# Patient Record
Sex: FEMALE | Race: WHITE | NOT HISPANIC OR LATINO | Employment: OTHER | URBAN - METROPOLITAN AREA
[De-identification: names, ages, dates, MRNs, and addresses within clinical notes are randomized per-mention and may not be internally consistent; named-entity substitution may affect disease eponyms.]

---

## 2020-11-20 ENCOUNTER — APPOINTMENT (OUTPATIENT)
Dept: NUCLEAR MEDICINE | Facility: CLINIC | Age: 75
End: 2020-11-20
Attending: EMERGENCY MEDICINE
Payer: COMMERCIAL

## 2020-11-20 ENCOUNTER — APPOINTMENT (OUTPATIENT)
Dept: GENERAL RADIOLOGY | Facility: CLINIC | Age: 75
End: 2020-11-20
Attending: EMERGENCY MEDICINE
Payer: COMMERCIAL

## 2020-11-20 ENCOUNTER — HOSPITAL ENCOUNTER (EMERGENCY)
Facility: CLINIC | Age: 75
Discharge: HOME | End: 2020-11-20
Attending: EMERGENCY MEDICINE | Admitting: EMERGENCY MEDICINE
Payer: COMMERCIAL

## 2020-11-20 VITALS
DIASTOLIC BLOOD PRESSURE: 63 MMHG | TEMPERATURE: 97.2 F | SYSTOLIC BLOOD PRESSURE: 142 MMHG | RESPIRATION RATE: 20 BRPM | OXYGEN SATURATION: 99 % | HEART RATE: 67 BPM

## 2020-11-20 DIAGNOSIS — R07.9 CHEST PAIN, UNSPECIFIED TYPE: ICD-10-CM

## 2020-11-20 DIAGNOSIS — R06.02 SOB (SHORTNESS OF BREATH): ICD-10-CM

## 2020-11-20 LAB
ANION GAP SERPL CALCULATED.3IONS-SCNC: 5 MMOL/L (ref 3–14)
BASOPHILS # BLD AUTO: 0.1 10E9/L (ref 0–0.2)
BASOPHILS NFR BLD AUTO: 0.6 %
BUN SERPL-MCNC: 48 MG/DL (ref 7–30)
CALCIUM SERPL-MCNC: 9.7 MG/DL (ref 8.5–10.1)
CHLORIDE SERPL-SCNC: 108 MMOL/L (ref 94–109)
CO2 SERPL-SCNC: 25 MMOL/L (ref 20–32)
CREAT SERPL-MCNC: 1.42 MG/DL (ref 0.52–1.04)
D DIMER PPP FEU-MCNC: 0.7 UG/ML FEU (ref 0–0.5)
DIFFERENTIAL METHOD BLD: NORMAL
EOSINOPHIL # BLD AUTO: 0.1 10E9/L (ref 0–0.7)
EOSINOPHIL NFR BLD AUTO: 0.7 %
ERYTHROCYTE [DISTWIDTH] IN BLOOD BY AUTOMATED COUNT: 14.1 % (ref 10–15)
GFR SERPL CREATININE-BSD FRML MDRD: 36 ML/MIN/{1.73_M2}
GLUCOSE SERPL-MCNC: 90 MG/DL (ref 70–99)
HCT VFR BLD AUTO: 38.1 % (ref 35–47)
HGB BLD-MCNC: 12.3 G/DL (ref 11.7–15.7)
IMM GRANULOCYTES # BLD: 0 10E9/L (ref 0–0.4)
IMM GRANULOCYTES NFR BLD: 0.4 %
LYMPHOCYTES # BLD AUTO: 1.8 10E9/L (ref 0.8–5.3)
LYMPHOCYTES NFR BLD AUTO: 17.1 %
MCH RBC QN AUTO: 30.3 PG (ref 26.5–33)
MCHC RBC AUTO-ENTMCNC: 32.3 G/DL (ref 31.5–36.5)
MCV RBC AUTO: 94 FL (ref 78–100)
MONOCYTES # BLD AUTO: 1 10E9/L (ref 0–1.3)
MONOCYTES NFR BLD AUTO: 9.2 %
NEUTROPHILS # BLD AUTO: 7.8 10E9/L (ref 1.6–8.3)
NEUTROPHILS NFR BLD AUTO: 72 %
NRBC # BLD AUTO: 0 10*3/UL
NRBC BLD AUTO-RTO: 0 /100
NT-PROBNP SERPL-MCNC: 191 PG/ML (ref 0–1800)
PLATELET # BLD AUTO: 196 10E9/L (ref 150–450)
POTASSIUM SERPL-SCNC: 4 MMOL/L (ref 3.4–5.3)
RBC # BLD AUTO: 4.06 10E12/L (ref 3.8–5.2)
SODIUM SERPL-SCNC: 138 MMOL/L (ref 133–144)
TROPONIN I SERPL-MCNC: <0.015 UG/L (ref 0–0.04)
TROPONIN I SERPL-MCNC: <0.015 UG/L (ref 0–0.04)
WBC # BLD AUTO: 10.8 10E9/L (ref 4–11)

## 2020-11-20 PROCEDURE — 343N000001 HC RX 343: Performed by: EMERGENCY MEDICINE

## 2020-11-20 PROCEDURE — 99285 EMERGENCY DEPT VISIT HI MDM: CPT | Mod: 25

## 2020-11-20 PROCEDURE — 85025 COMPLETE CBC W/AUTO DIFF WBC: CPT | Performed by: EMERGENCY MEDICINE

## 2020-11-20 PROCEDURE — 93005 ELECTROCARDIOGRAM TRACING: CPT

## 2020-11-20 PROCEDURE — 83880 ASSAY OF NATRIURETIC PEPTIDE: CPT | Performed by: EMERGENCY MEDICINE

## 2020-11-20 PROCEDURE — 80048 BASIC METABOLIC PNL TOTAL CA: CPT | Performed by: EMERGENCY MEDICINE

## 2020-11-20 PROCEDURE — 96374 THER/PROPH/DIAG INJ IV PUSH: CPT

## 2020-11-20 PROCEDURE — 85379 FIBRIN DEGRADATION QUANT: CPT | Performed by: EMERGENCY MEDICINE

## 2020-11-20 PROCEDURE — 78580 LUNG PERFUSION IMAGING: CPT

## 2020-11-20 PROCEDURE — 71045 X-RAY EXAM CHEST 1 VIEW: CPT

## 2020-11-20 PROCEDURE — 84484 ASSAY OF TROPONIN QUANT: CPT | Mod: 91 | Performed by: EMERGENCY MEDICINE

## 2020-11-20 PROCEDURE — A9540 TC99M MAA: HCPCS | Performed by: EMERGENCY MEDICINE

## 2020-11-20 RX ADMIN — KIT FOR THE PREPARATION OF TECHNETIUM TC 99M ALBUMIN AGGREGATED 3.3 MCI.: 2.5 INJECTION, POWDER, FOR SOLUTION INTRAVENOUS at 22:21

## 2020-11-20 ASSESSMENT — ENCOUNTER SYMPTOMS
FEVER: 0
COUGH: 0
SHORTNESS OF BREATH: 1

## 2020-11-20 NOTE — ED AVS SNAPSHOT
[facility] Emergency Dept  [address]  [facility] [address]  Phone: [phone]  Fax: [phone]                                    Brandy Muñoz   MRN: [id number]    Department: [facility] Emergency Dept   Date of Visit: 11/20/2020           After Visit Summary Signature Page    I have received my discharge instructions, and my questions have been answered. I have discussed any challenges I see with this plan with the nurse or doctor.    ..........................................................................................................................................  Patient/Patient Representative Signature      ..........................................................................................................................................  Patient Representative Print Name and Relationship to Patient    ..................................................               ................................................  Date                                   Time    ..........................................................................................................................................  Reviewed by Signature/Title    ...................................................              ..............................................  Date                                               Time          22EPIC Rev 08/18

## 2020-11-21 NOTE — ED PROVIDER NOTES
History     Chief Complaint:  Chest Pain and Shortness of Breath    HPI   Brandy Muñoz is a 75 year old female who presents with chest pain and shortness of breath. The patient reports this morning she woke up with shortness of breath and felt like she could not take a deep breath. She also complains of central chest pain. Following onset, she took 4 baby aspirin and notes mild relief but still feels pain with deep breathing. She has tried using an inhaler with no relief. She endorses intermittent, chronic ankle swelling. She denies fever, cough, and any known COVID exposure. She denies any recent travel. She denies a cardiac history.  Of note, the patient is a former smoker and quit roughly 30 years ago.     Allergies:  Atorvastatin   Atenolol  Simvastatin   Cephalexin      Medications:    Allopurinol  Wellbutrin  Cholecalciferol   Glipizide  Lisinopril   Omeprazole   Crestor  Arimidex    Past Medical History:    Chronic kidney disease   COPD   Depressive disorder   Diabetes    Hypercholesteraemia   Hypertension  Osteoporosis   CAD  Malignant neoplasm of left breast  GERD  Hyperparathyroidism  GI bleeding   Renal dysgenesis  Pulmonary nodule  Obesity      Past Surgical History:    Appendectomy   Tonsillectomy and Adenoidectomy   Hysterectomy   Varicose vein surgery  Tubal ligation  Cataract removal with implant  Cataract removal   Breast biopsy  Breast lumpectomy     Family History:    Heart disease   Cataract   Diabetes   Stroke   Brain aneurysm    Social History:  Smoking status- former smoker  Alcohol use- yes  Marital Status:      Review of Systems   Constitutional: Negative for fever.   Respiratory: Positive for shortness of breath. Negative for cough.    Cardiovascular: Positive for chest pain.   All other systems reviewed and are negative.    Physical Exam     Patient Vitals for the past 24 hrs:   BP Temp Temp src Pulse Resp SpO2   11/20/20 2000 (!) 143/65 -- -- 74 -- 99 %   11/20/20 1835 (!)  140/61 97.2  F (36.2  C) Temporal 74 20 99 %     Physical Exam  General: Patient is alert and interactive when I enter the room  Head:  The scalp, face, and head appear normal  Eyes:  Conjunctivae are normal  ENT:    The nose is normal    Pinnae are normal    External acoustic canals are normal  Neck:  Trachea midline  CV:  Pulses are normal, RRR    Resp:  No respiratory distress, CTAB   Abdomen:      Soft, non-tender, non-distended  Musc:  Normal muscular tone    No major joint effusions    No asymmetric leg swelling  Skin:  No rash or lesions noted  Neuro:  Speech is normal and fluent. Face is symmetric.     Moving all extremities well.   Psych: Awake. Alert.  Normal affect.  Appropriate interactions.    Emergency Department Course     ECG (18:50:54):  Rate 71 bpm. PA interval 202. QRS duration 88. QT/QTc 380/412. P-R-T axes 30 1 34. Normal sinus rhythm. Normal ECG. Interpreted at 2108 by Prabha Hayes MD.     Imaging:  Radiology findings were communicated with the patient who voiced understanding of the findings.    XR Chest Port 1 View:                                                                   IMPRESSION: Lung volumes are lower but there is been otherwise no significant change. Calcified granuloma right upper lobe but no new focal pneumonia. Heart size normal.  As read by Radiology.     Lung vent and perf (NM)  Pending   As read by Radiology.     Laboratory:  Laboratory findings were communicated with the patient who voiced understanding of the findings.    CBC: WNL (WBC 10.8, HGB 12.3, )  BMP: Urea Nitrogen: 48 (H), GFR: 36 (L), o/w WNL (Creatinine: 1.42 (H))    D-dimer: 0.7 (H)   Troponin (Collected 1921): <0.015      Emergency Department Course:  Past medical records, nursing notes, and vitals reviewed.    1850 EKG obtained in the ED, see results above.     1921 IV was inserted and blood was drawn for laboratory testing, results above.    2012 I performed an exam of the patient as documented  above.      Impression & Plan       Medical Decision Making:  Brandy Muñoz is a 75 year old female is here CKD who presents with shortness of breath and chest pain.  Her chest pain is fairly pleuritic and his only had a few episodes today.  It seems like her breathing is the most concerning aspect.  She is afebrile and otherwise well-appearing.  Her EKG was unremarkable.  CBC and BMP were normal.  BNP was negative.  Troponin was also unremarkable.  D-dimer was mildly elevated.  With her CKD we need to do a VQ scan.  Chest x-ray reveals no acute abnormality.  VQ scan is pending at this time.  Plan for repeat troponin when she comes back from nuclear medicine and then plan for an outpatient stress test.  I do not think patient needs admission as she has had more shortness of breath and chest pain her work-up today is unremarkable and she is a candidate for outpatient management.  Patient signed out to Dr. Polanco pending VQ scan and repeat troponin.    Diagnosis:    ICD-10-CM    1. Chest pain, unspecified type  R07.9 Nt probnp inpatient     Troponin I (now)     NM Lexiscan stress test   2. SOB (shortness of breath)  R06.02      Disposition:  Signed out to Dr. Polanco, pending v/q     Discharge Medications:  New Prescriptions    No medications on file     Scribe Disclosure:  I, Kate Hurley, am serving as a scribe at 8:12 PM on 11/20/2020 to document services personally performed by Prabha Hayes MD based on my observations and the provider's statements to me.        Prabha Hayes MD  11/21/20 7224

## 2020-11-21 NOTE — ED PROVIDER NOTES
Received sign out from Dr. Hayes to follow up on V?Q scan and troponin which were both negative. Appropriate for discharge      Solo Polanco MD  11/20/20 0802

## 2020-11-23 LAB — INTERPRETATION ECG - MUSE: NORMAL

## 2021-04-10 ENCOUNTER — IMMUNIZATION (OUTPATIENT)
Dept: NURSING | Facility: CLINIC | Age: 76
End: 2021-04-10
Payer: COMMERCIAL

## 2021-04-10 PROCEDURE — 91301 PR COVID VAC MODERNA 100 MCG/0.5 ML IM: CPT

## 2021-04-10 PROCEDURE — 0011A PR COVID VAC MODERNA 100 MCG/0.5 ML IM: CPT

## 2021-05-08 ENCOUNTER — IMMUNIZATION (OUTPATIENT)
Dept: NURSING | Facility: CLINIC | Age: 76
End: 2021-05-08
Attending: OBSTETRICS & GYNECOLOGY
Payer: COMMERCIAL

## 2021-05-08 PROCEDURE — 0012A PR COVID VAC MODERNA 100 MCG/0.5 ML IM: CPT

## 2021-05-08 PROCEDURE — 91301 PR COVID VAC MODERNA 100 MCG/0.5 ML IM: CPT

## 2021-07-13 ENCOUNTER — HOSPITAL ENCOUNTER (EMERGENCY)
Facility: CLINIC | Age: 76
Discharge: HOME | End: 2021-07-13
Attending: EMERGENCY MEDICINE | Admitting: EMERGENCY MEDICINE
Payer: COMMERCIAL

## 2021-07-13 VITALS
SYSTOLIC BLOOD PRESSURE: 145 MMHG | RESPIRATION RATE: 18 BRPM | OXYGEN SATURATION: 99 % | TEMPERATURE: 95.8 F | DIASTOLIC BLOOD PRESSURE: 70 MMHG | HEART RATE: 60 BPM | WEIGHT: 143.74 LBS

## 2021-07-13 DIAGNOSIS — M79.662 PAIN OF LEFT LOWER LEG: ICD-10-CM

## 2021-07-13 PROCEDURE — 99282 EMERGENCY DEPT VISIT SF MDM: CPT

## 2021-07-13 NOTE — ED TRIAGE NOTES
Diabetic, neuropathy in legs long time now having pain above left ankle, does have a bakers cyst behind left knee. Pt doesn't take any med's for neuropathy having pain above ankle started 2400. intermittent pt worried about blood clot. Pt has no hx of them

## 2021-07-13 NOTE — ED PROVIDER NOTES
History   Chief Complaint:  Leg Pain     The history is provided by the patient.      Brandy Muñoz is a 76 year old female with history of CKD, COPD, diabetes mellitus, hypertension and hypercholesterolemia who presents with leg pain. The patient tells us that she went to bed yesterday evening feeling normal but was woken up multiple times with front sided lower left leg pain. She describes the pain as sharp, intermittent and non radiating. She also tells us that she has a history of diabetic neuropathy in her feet that have caused her to have issues with balance but this feels much different. The patient denies rash or a history of blood clots.     Review of Systems   All other systems reviewed and are negative.    Allergies:  Atorvastatin   Atenolol  Simvastatin   Cephalexin      Medications:  Allopurinol  Wellbutrin  Cholecalciferol   Glipizide  Lisinopril   Omeprazole   Crestor  Arimidex    Past Medical History:    Chronic kidney disease            COPD   Depressive disorder      Diabetes            Hypercholesteraemia    Hypertension  Osteoporosis   CAD  Malignant neoplasm of left breast  GERD  Hyperparathyroidism  GI bleeding   Renal dysgenesis  Pulmonary nodule  Obesity                 Past Surgical History:    Appendectomy   Tonsillectomy and Adenoidectomy   Hysterectomy   Varicose vein surgery  Tubal ligation  Cataract removal with implant  Cataract removal   Breast biopsy  Breast lumpectomy      Family History:    Heart disease   Cataract   Diabetes   Stroke   Brain aneurysm    Social History:  The patient presents to the ED with her      Physical Exam     Patient Vitals for the past 24 hrs:   BP Temp Temp src Pulse Resp SpO2 Weight   07/13/21 0322 (!) 164/68 95.8  F (35.4  C) Temporal 59 18 100 % 65.2 kg (143 lb 11.8 oz)       Physical Exam  General: Patient is alert and cooperative.  HENT:  Normal appearance.   Eyes: EOMI. Normal conjunctiva.  Neck:  Normal range of motion and appearance.    Cardiovascular:  Normal rate. Normal distal perfusion LLE.   Pulmonary/Chest:  Effort normal.   Abdominal: no tenderness.   Musculoskeletal: Normal range of motion. No calf swelling or asymmetry. No mass or cord.   Neurological: oriented, normal strength, sensation, and coordination.   Skin: Warm and dry. No rash or bruising. No warmth or erythema.   Psychiatric: Normal mood and affect. Normal behavior and judgement.      Emergency Department Course     Reviewed:  I reviewed nursing notes, vitals, past medical history and care everywhere    Assessments:  0628 I obtained history and examined the patient as noted above.   0635 I rechecked the patient and discussed with her the plan for discharge.     Disposition:  The patient was discharged to home.     Impression & Plan     Medical Decision Making:  Afebrile 76-year-old female with complaints of atraumatic anterior left lower leg discomfort.  No history of trauma or exertional trigger.  She has no associated calf discomfort.  There is no history of DVT but she does note a known Baker's cyst on that side.  Examination is unremarkable.  There is no calf tenderness or asymmetry, no overlying skin changes or reproducible tenderness to the distal anterior lower leg which is where she is having episodic discomfort.  There is normal distal perfusion.  She does have a history of a diabetic neuropathy with no evidence of an occult infectious process.  This appears to be musculoskeletal.  There is no concern for DVT or indication for ultrasonography or other imaging which patient is comfortable with.  I have recommended monitoring her symptoms using over-the-counter analgesics and following up with clinic if continuing with the understanding that she should return if her symptoms become more consistent or worse or began to develop calf or other discomfort or concerns.    Diagnosis:    ICD-10-CM    1. Pain of left lower leg  M79.662      Scribe Disclosure:  IDagoberto  am serving as a scribe at 6:26 AM on 7/13/2021 to document services personally performed by Benito Perkins MD, based on my observations and the provider's statements to me.          Benito Perkins MD  07/13/21 1404

## 2021-12-13 ENCOUNTER — HOSPITAL ENCOUNTER (EMERGENCY)
Facility: CLINIC | Age: 76
Discharge: HOME | End: 2021-12-13
Payer: COMMERCIAL

## 2021-12-13 ENCOUNTER — APPOINTMENT (OUTPATIENT)
Dept: LAB | Facility: CLINIC | Age: 76
End: 2021-12-13
Payer: COMMERCIAL